# Patient Record
Sex: MALE | Race: WHITE | NOT HISPANIC OR LATINO | URBAN - METROPOLITAN AREA
[De-identification: names, ages, dates, MRNs, and addresses within clinical notes are randomized per-mention and may not be internally consistent; named-entity substitution may affect disease eponyms.]

---

## 2020-10-29 PROBLEM — Z00.00 ENCOUNTER FOR PREVENTIVE HEALTH EXAMINATION: Status: ACTIVE | Noted: 2020-10-29

## 2020-11-02 ENCOUNTER — OUTPATIENT (OUTPATIENT)
Dept: OUTPATIENT SERVICES | Facility: HOSPITAL | Age: 80
LOS: 1 days | End: 2020-11-02
Payer: MEDICARE

## 2020-11-02 ENCOUNTER — APPOINTMENT (OUTPATIENT)
Dept: PULMONOLOGY | Facility: CLINIC | Age: 80
End: 2020-11-02
Payer: MEDICARE

## 2020-11-02 VITALS
DIASTOLIC BLOOD PRESSURE: 80 MMHG | BODY MASS INDEX: 22.81 KG/M2 | HEIGHT: 69 IN | RESPIRATION RATE: 16 BRPM | OXYGEN SATURATION: 95 % | SYSTOLIC BLOOD PRESSURE: 157 MMHG | TEMPERATURE: 97.3 F | WEIGHT: 154 LBS | HEART RATE: 66 BPM

## 2020-11-02 DIAGNOSIS — K21.9 GASTRO-ESOPHAGEAL REFLUX DISEASE W/OUT ESOPHAGITIS: ICD-10-CM

## 2020-11-02 DIAGNOSIS — I10 ESSENTIAL (PRIMARY) HYPERTENSION: ICD-10-CM

## 2020-11-02 DIAGNOSIS — Z80.0 FAMILY HISTORY OF MALIGNANT NEOPLASM OF DIGESTIVE ORGANS: ICD-10-CM

## 2020-11-02 DIAGNOSIS — Z98.89 OTHER SPECIFIED POSTPROCEDURAL STATES: Chronic | ICD-10-CM

## 2020-11-02 DIAGNOSIS — K45.1 OTHER SPECIFIED ABDOMINAL HERNIA WITH GANGRENE: Chronic | ICD-10-CM

## 2020-11-02 DIAGNOSIS — E78.00 PURE HYPERCHOLESTEROLEMIA, UNSPECIFIED: ICD-10-CM

## 2020-11-02 DIAGNOSIS — N40.0 BENIGN PROSTATIC HYPERPLASIA WITHOUT LOWER URINARY TRACT SYMPMS: ICD-10-CM

## 2020-11-02 DIAGNOSIS — Z82.49 FAMILY HISTORY OF ISCHEMIC HEART DISEASE AND OTHER DISEASES OF THE CIRCULATORY SYSTEM: ICD-10-CM

## 2020-11-02 DIAGNOSIS — Z86.73 PERSONAL HISTORY OF TRANSIENT ISCHEMIC ATTACK (TIA), AND CEREBRAL INFARCTION W/OUT RESIDUAL DEFICITS: ICD-10-CM

## 2020-11-02 DIAGNOSIS — Z11.59 ENCOUNTER FOR SCREENING FOR OTHER VIRAL DISEASES: ICD-10-CM

## 2020-11-02 DIAGNOSIS — Z28.21 IMMUNIZATION NOT CARRIED OUT BECAUSE OF PATIENT REFUSAL: ICD-10-CM

## 2020-11-02 LAB
BASOPHILS # BLD AUTO: 0.11 K/UL
BASOPHILS NFR BLD AUTO: 0.9 %
EOSINOPHIL # BLD AUTO: 0.1 K/UL
EOSINOPHIL NFR BLD AUTO: 0.8 %
HCT VFR BLD CALC: 46.4 %
HGB BLD-MCNC: 14.8 G/DL
IMM GRANULOCYTES NFR BLD AUTO: 0.2 %
LYMPHOCYTES # BLD AUTO: 1.43 K/UL
LYMPHOCYTES NFR BLD AUTO: 11.4 %
MAN DIFF?: NORMAL
MCHC RBC-ENTMCNC: 30.6 PG
MCHC RBC-ENTMCNC: 31.9 GM/DL
MCV RBC AUTO: 95.9 FL
MONOCYTES # BLD AUTO: 1.37 K/UL
MONOCYTES NFR BLD AUTO: 10.9 %
NEUTROPHILS # BLD AUTO: 9.5 K/UL
NEUTROPHILS NFR BLD AUTO: 75.8 %
PLATELET # BLD AUTO: 371 K/UL
RBC # BLD: 4.84 M/UL
RBC # FLD: 13.2 %
SARS-COV-2 RNA SPEC QL NAA+PROBE: SIGNIFICANT CHANGE UP
WBC # FLD AUTO: 12.54 K/UL

## 2020-11-02 PROCEDURE — U0003: CPT

## 2020-11-02 PROCEDURE — 99204 OFFICE O/P NEW MOD 45 MIN: CPT

## 2020-11-02 RX ORDER — ASPIRIN 81 MG/1
81 TABLET, CHEWABLE ORAL
Refills: 0 | Status: ACTIVE | COMMUNITY

## 2020-11-02 RX ORDER — FINASTERIDE 5 MG/1
5 TABLET, FILM COATED ORAL
Qty: 90 | Refills: 0 | Status: ACTIVE | COMMUNITY
Start: 2020-06-05

## 2020-11-02 RX ORDER — TAMSULOSIN HYDROCHLORIDE 0.4 MG/1
0.4 CAPSULE ORAL
Qty: 90 | Refills: 0 | Status: ACTIVE | COMMUNITY
Start: 2020-08-17

## 2020-11-02 RX ORDER — DEXLANSOPRAZOLE 60 MG/1
60 CAPSULE, DELAYED RELEASE ORAL
Qty: 90 | Refills: 0 | Status: ACTIVE | COMMUNITY
Start: 2020-10-19

## 2020-11-02 RX ORDER — AMLODIPINE BESYLATE 10 MG/1
10 TABLET ORAL
Qty: 90 | Refills: 0 | Status: ACTIVE | COMMUNITY
Start: 2020-10-19

## 2020-11-02 RX ORDER — OMEGA-3-ACID ETHYL ESTERS CAPSULES 1 G/1
1 CAPSULE, LIQUID FILLED ORAL
Qty: 360 | Refills: 0 | Status: ACTIVE | COMMUNITY
Start: 2020-08-17

## 2020-11-02 NOTE — HISTORY OF PRESENT ILLNESS
[Current] : current [Never] : never [TextBox_4] : Mr. Huang is an 80-year-old male with a history of HTN, HLD, CVA with no residual deficits, GERD, BPH, and COPD who presents for evaluation. He lost his voice 3 weeks ago. He went for CT neck and chest, which showed a RUL apical cavitary mass with enlarged prevascular lymph node, concerning for metastatic disease. He brought CD, but it is cracked and unable to view.\par \par He denies any dyspnea at rest or with exertion. He reports a cough with sputum production related to chronic smoking (1/2-3/4 ppd for 65 years). Mucous is clear-yellow in color. No hemoptysis. No chest pain or wheezing. He has had change in voice with hoarseness for the past 3 weeks. He takes the Breo inhaler occasionally, but does not like to take daily. He is still smoking. Reports about 5 lb weight loss in the past year. Of note, he reports history of TB in his mother as a child, but he was never treated for TB or latent TB.\par \par CT Neck/Chest (Oct 2020): no appreciable neck mass or pathologically enlarged lymph nodes. In the chest, there is a left apical cavitary mass 7x6.4 cm, concerning for neoplasm or possibly cavitary pneumonia. Enlarged 2.5x1.4cm prevascular lymph node, concerning for metastasis. 6 mm LLL nodule, concerning for satellite nodule. Emphysema. Ectasia of ascending aorta 4 cm. Dilated main pulmonary artery. Coronary artery calcifications. Bilateral indeterminate adrenal nodules as well as renal structures (MRI abdomen recommended). Bilateral nonobstructing renal calculi, partially visualized, larger and more numerous on the left.

## 2020-11-02 NOTE — ASSESSMENT
[FreeTextEntry1] : Mr. Huang is an 80-year-old male with a history of HTN, HLD, CVA with no residual deficits, GERD, BPH, and COPD who presents for evaluation of a 7 x 6.4 cm left apical cavitary lung mass with associated prevascular lymphadenopathy and LLL 6 mm lung nodule. In the last month, he has developed loss of voice worrisome for pancoast tumor with recurrent laryngeal nerve injury. Unfortunately, CD of images is cracked and unable to be viewed. He reports a chronic cough with sputum production related to chronic smoking. Also with 5 lb weight loss in last year. No dyspnea at rest or with exertion. No chest pain, wheezing, hemoptysis, fevers, chills, or night sweats.\par \par 1. Left upper lobe apical cavitary lung mass with mediastinal lymphadenopathy:\par - Presentation worrisome for malignancy\par - Unable to view images as CD is cracked, son Yonas will bring me another copy of CD\par - Needs PET/CT this week to evaluate for metastases. There is a 6mm LLL lung nodule that may represent metastasis. There are also bilateral adrenal nodules. May require MRI abdomen\par - Depending on results of PET/CT, will need either CT-guided biopsy or bronch with EBUS/TBNA for staging\par - Reports history of TB in mother when he was a child. No personal history of TB. Never treated. Will check CBC with diff and quantiferon gold TB\par \par 2. COPD/Emphysema:\par - Complete PFTs\par - Start Breo Ellipta 200-25 mcg 1 inhalation daily, rinse after use\par - Albuterol inhaler prn\par - Check CBC with diff, alpha-1 antitrypsin, HIV, and 25-OH vitamin D\par \par 3. Current smoker:\par - Smoking counseling cessation provided\par - Declining further assistance at this time\par \par 4. Dilated pulmonary artery:\par - Obtain 2D-echo to evaluate presence and/or severity of pulmonary hypertension\par - Etiology likely related to COPD +/- hypertensive heart disease\par \par 5. HCM:\par - Declining influenza vaccination\par - Follow-up in 2-4 weeks or sooner prn

## 2020-11-02 NOTE — PHYSICAL EXAM
[No Acute Distress] : no acute distress [Well Nourished] : well nourished [Well Groomed] : well groomed [Well Developed] : well developed [Normal Oropharynx] : normal oropharynx [Normal Appearance] : normal appearance [Supple] : supple [No Neck Mass] : no neck mass [No JVD] : no jvd [Normal Rate/Rhythm] : normal rate/rhythm [Normal Pulses] : normal pulses [Normal S1, S2] : normal s1, s2 [No Murmurs] : no murmurs [No Resp Distress] : no resp distress [No Abnormalities] : no abnormalities [Benign] : benign [Not Tender] : not tender [Soft] : soft [Normal Gait] : normal gait [No Clubbing] : no clubbing [No Cyanosis] : no cyanosis [No Edema] : no edema [FROM] : FROM [Normal Color/ Pigmentation] : normal color/ pigmentation [No Focal Deficits] : no focal deficits [Cranial Nerves Intact] : cranial nerves intact [No Motor Deficits] : no motor deficits [Oriented x3] : oriented x3 [Normal Affect] : normal affect [TextBox_11] : hoarse voice [TextBox_68] : decreased breath sounds in left upper lung; distant breath sounds throughout

## 2020-11-02 NOTE — CONSULT LETTER
[DrKiersten  ___] : Dr. HARPER [Dear  ___] : Dear  [unfilled], [Consult Letter:] : I had the pleasure of evaluating your patient, [unfilled]. [Please see my note below.] : Please see my note below. [Consult Closing:] : Thank you very much for allowing me to participate in the care of this patient.  If you have any questions, please do not hesitate to contact me. [Sincerely,] : Sincerely, [FreeTextEntry2] : Dr. Felix Joiner MD\par Fax: [phone] [FreeTextEntry3] : Elijah Christina MD\par Attending Physician in Pulmonary & Critical Care Medicine\par  of Medicine\par [employer] [facility]

## 2020-11-04 LAB
25(OH)D3 SERPL-MCNC: 43 NG/ML
A1AT SERPL-MCNC: 159 MG/DL
HIV1+2 AB SPEC QL IA.RAPID: NONREACTIVE
M TB IFN-G BLD-IMP: NEGATIVE
QUANTIFERON TB PLUS MITOGEN MINUS NIL: 0.69 IU/ML
QUANTIFERON TB PLUS NIL: 0.02 IU/ML
QUANTIFERON TB PLUS TB1 MINUS NIL: 0.12 IU/ML
QUANTIFERON TB PLUS TB2 MINUS NIL: 0.08 IU/ML

## 2020-11-09 ENCOUNTER — APPOINTMENT (OUTPATIENT)
Dept: NUCLEAR MEDICINE | Facility: IMAGING CENTER | Age: 80
End: 2020-11-09
Payer: MEDICARE

## 2020-11-09 ENCOUNTER — OUTPATIENT (OUTPATIENT)
Dept: OUTPATIENT SERVICES | Facility: HOSPITAL | Age: 80
LOS: 1 days | End: 2020-11-09
Payer: MEDICARE

## 2020-11-09 DIAGNOSIS — Z98.89 OTHER SPECIFIED POSTPROCEDURAL STATES: Chronic | ICD-10-CM

## 2020-11-09 DIAGNOSIS — J98.4 OTHER DISORDERS OF LUNG: ICD-10-CM

## 2020-11-09 DIAGNOSIS — K45.1 OTHER SPECIFIED ABDOMINAL HERNIA WITH GANGRENE: Chronic | ICD-10-CM

## 2020-11-09 PROCEDURE — 78815 PET IMAGE W/CT SKULL-THIGH: CPT | Mod: 26,PI

## 2020-11-09 PROCEDURE — 78815 PET IMAGE W/CT SKULL-THIGH: CPT

## 2020-11-09 PROCEDURE — A9552: CPT

## 2020-11-12 ENCOUNTER — APPOINTMENT (OUTPATIENT)
Dept: CV DIAGNOSITCS | Facility: HOSPITAL | Age: 80
End: 2020-11-12

## 2020-11-12 ENCOUNTER — OUTPATIENT (OUTPATIENT)
Dept: OUTPATIENT SERVICES | Facility: HOSPITAL | Age: 80
LOS: 1 days | End: 2020-11-12
Payer: MEDICARE

## 2020-11-12 ENCOUNTER — TRANSCRIPTION ENCOUNTER (OUTPATIENT)
Age: 80
End: 2020-11-12

## 2020-11-12 DIAGNOSIS — Z98.89 OTHER SPECIFIED POSTPROCEDURAL STATES: Chronic | ICD-10-CM

## 2020-11-12 DIAGNOSIS — K45.1 OTHER SPECIFIED ABDOMINAL HERNIA WITH GANGRENE: Chronic | ICD-10-CM

## 2020-11-12 PROCEDURE — 93306 TTE W/DOPPLER COMPLETE: CPT | Mod: 26

## 2020-11-15 ENCOUNTER — NON-APPOINTMENT (OUTPATIENT)
Age: 80
End: 2020-11-15

## 2020-11-19 DIAGNOSIS — Z01.818 ENCOUNTER FOR OTHER PREPROCEDURAL EXAMINATION: ICD-10-CM

## 2020-11-20 ENCOUNTER — APPOINTMENT (OUTPATIENT)
Dept: DISASTER EMERGENCY | Facility: CLINIC | Age: 80
End: 2020-11-20

## 2020-11-23 ENCOUNTER — OUTPATIENT (OUTPATIENT)
Dept: OUTPATIENT SERVICES | Facility: HOSPITAL | Age: 80
LOS: 1 days | End: 2020-11-23
Payer: MEDICARE

## 2020-11-23 VITALS
OXYGEN SATURATION: 97 % | HEIGHT: 69 IN | DIASTOLIC BLOOD PRESSURE: 64 MMHG | RESPIRATION RATE: 18 BRPM | HEART RATE: 64 BPM | WEIGHT: 162.04 LBS | SYSTOLIC BLOOD PRESSURE: 132 MMHG | TEMPERATURE: 97 F

## 2020-11-23 DIAGNOSIS — I10 ESSENTIAL (PRIMARY) HYPERTENSION: ICD-10-CM

## 2020-11-23 DIAGNOSIS — R59.0 LOCALIZED ENLARGED LYMPH NODES: ICD-10-CM

## 2020-11-23 DIAGNOSIS — K45.1 OTHER SPECIFIED ABDOMINAL HERNIA WITH GANGRENE: Chronic | ICD-10-CM

## 2020-11-23 DIAGNOSIS — Z98.89 OTHER SPECIFIED POSTPROCEDURAL STATES: Chronic | ICD-10-CM

## 2020-11-23 DIAGNOSIS — Z90.49 ACQUIRED ABSENCE OF OTHER SPECIFIED PARTS OF DIGESTIVE TRACT: Chronic | ICD-10-CM

## 2020-11-23 LAB
ALBUMIN SERPL ELPH-MCNC: 4.2 G/DL — SIGNIFICANT CHANGE UP (ref 3.3–5)
ALP SERPL-CCNC: 81 U/L — SIGNIFICANT CHANGE UP (ref 40–120)
ALT FLD-CCNC: 10 U/L — SIGNIFICANT CHANGE UP (ref 4–41)
ANION GAP SERPL CALC-SCNC: 12 MMO/L — SIGNIFICANT CHANGE UP (ref 7–14)
AST SERPL-CCNC: 10 U/L — SIGNIFICANT CHANGE UP (ref 4–40)
BILIRUB SERPL-MCNC: 0.3 MG/DL — SIGNIFICANT CHANGE UP (ref 0.2–1.2)
BUN SERPL-MCNC: 24 MG/DL — HIGH (ref 7–23)
CALCIUM SERPL-MCNC: 9.9 MG/DL — SIGNIFICANT CHANGE UP (ref 8.4–10.5)
CHLORIDE SERPL-SCNC: 102 MMOL/L — SIGNIFICANT CHANGE UP (ref 98–107)
CO2 SERPL-SCNC: 27 MMOL/L — SIGNIFICANT CHANGE UP (ref 22–31)
CREAT SERPL-MCNC: 1.32 MG/DL — HIGH (ref 0.5–1.3)
GLUCOSE SERPL-MCNC: 73 MG/DL — SIGNIFICANT CHANGE UP (ref 70–99)
HCT VFR BLD CALC: 43.9 % — SIGNIFICANT CHANGE UP (ref 39–50)
HGB BLD-MCNC: 14.4 G/DL — SIGNIFICANT CHANGE UP (ref 13–17)
MCHC RBC-ENTMCNC: 31.1 PG — SIGNIFICANT CHANGE UP (ref 27–34)
MCHC RBC-ENTMCNC: 32.8 % — SIGNIFICANT CHANGE UP (ref 32–36)
MCV RBC AUTO: 94.8 FL — SIGNIFICANT CHANGE UP (ref 80–100)
NRBC # FLD: 0 K/UL — SIGNIFICANT CHANGE UP (ref 0–0)
PLATELET # BLD AUTO: 364 K/UL — SIGNIFICANT CHANGE UP (ref 150–400)
PMV BLD: 10.9 FL — SIGNIFICANT CHANGE UP (ref 7–13)
POTASSIUM SERPL-MCNC: 3.8 MMOL/L — SIGNIFICANT CHANGE UP (ref 3.5–5.3)
POTASSIUM SERPL-SCNC: 3.8 MMOL/L — SIGNIFICANT CHANGE UP (ref 3.5–5.3)
PROT SERPL-MCNC: 7.2 G/DL — SIGNIFICANT CHANGE UP (ref 6–8.3)
RBC # BLD: 4.63 M/UL — SIGNIFICANT CHANGE UP (ref 4.2–5.8)
RBC # FLD: 13.2 % — SIGNIFICANT CHANGE UP (ref 10.3–14.5)
SODIUM SERPL-SCNC: 141 MMOL/L — SIGNIFICANT CHANGE UP (ref 135–145)
WBC # BLD: 13.26 K/UL — HIGH (ref 3.8–10.5)
WBC # FLD AUTO: 13.26 K/UL — HIGH (ref 3.8–10.5)

## 2020-11-23 PROCEDURE — 93010 ELECTROCARDIOGRAM REPORT: CPT

## 2020-11-23 RX ORDER — ASPIRIN/CALCIUM CARB/MAGNESIUM 324 MG
1 TABLET ORAL
Qty: 0 | Refills: 0 | DISCHARGE

## 2020-11-23 RX ORDER — AMLODIPINE BESYLATE 2.5 MG/1
1 TABLET ORAL
Qty: 0 | Refills: 0 | DISCHARGE

## 2020-11-23 RX ORDER — DEXLANSOPRAZOLE 30 MG/1
1 CAPSULE, DELAYED RELEASE ORAL
Qty: 0 | Refills: 0 | DISCHARGE

## 2020-11-23 NOTE — H&P PST ADULT - NEGATIVE GENERAL GENITOURINARY SYMPTOMS
no bladder infections/no dysuria/no flank pain R/no hematuria/no flank pain L/no incontinence/no renal colic

## 2020-11-23 NOTE — H&P PST ADULT - MS EXT PE MLT D E PC
Chief Complaint   Patient presents with     Blood Draw     Labs collected from PIV placed by Heike GUTIERREZ. Line flushed wtih saline. Vs taken and pt checked in for appt.     Labs drawn from PIV placed by Heike vascular access RN. Line flushed with saline. Vitals taken. Pt checked in for appointment(s).    Mariela Santamaria RN   no clubbing/no cyanosis/no pedal edema

## 2020-11-23 NOTE — H&P PST ADULT - NSICDXPASTMEDICALHX_GEN_ALL_CORE_FT
PAST MEDICAL HISTORY:  Acid reflux     H/O cataract extraction left    HTN (hypertension)     Localized enlarged lymph nodes      PAST MEDICAL HISTORY:  Acid reflux     H/O cataract extraction left    History of TIA (transient ischemic attack) ~2017    HTN (hypertension)     Localized enlarged lymph nodes

## 2020-11-23 NOTE — H&P PST ADULT - NSANTHOSAYNRD_GEN_A_CORE
No. FROYLAN screening performed.  STOP BANG Legend: 0-2 = LOW Risk; 3-4 = INTERMEDIATE Risk; 5-8 = HIGH Risk

## 2020-11-23 NOTE — H&P PST ADULT - NSICDXPROBLEM_GEN_ALL_CORE_FT
PROBLEM DIAGNOSES  Problem: Localized enlarged lymph nodes  Assessment and Plan: pt scheduled for claudy endobronchial ultrasound bronch/radia, endobronchial ultrasound/linear on 12/02/2020  Preop instructions provided. Pt verbalized understanding.   pt to take dexilant for GI prophylaxis  pt aware of need for COVID testing prior to procedure and advised to coordinate with surgeon  pt has appt for med eval today, pending reports from PST, copy requested      Problem: HTN (hypertension)  Assessment and Plan: pt instructed to take norvasc with a sip of water on the morning of the surgery

## 2020-11-23 NOTE — H&P PST ADULT - NSICDXPASTSURGICALHX_GEN_ALL_CORE_FT
PAST SURGICAL HISTORY:  Hernia of appendix with gangrene     History of bilateral inguinal hernia repair     S/P cholecystectomy

## 2020-11-23 NOTE — H&P PST ADULT - NEGATIVE ENMT SYMPTOMS
no sinus symptoms/no hearing difficulty/hoarseness/no nasal congestion/no post-nasal discharge/no throat pain/no dysphagia

## 2020-11-25 ENCOUNTER — APPOINTMENT (OUTPATIENT)
Dept: PULMONOLOGY | Facility: CLINIC | Age: 80
End: 2020-11-25
Payer: MEDICARE

## 2020-11-25 VITALS
OXYGEN SATURATION: 96 % | DIASTOLIC BLOOD PRESSURE: 74 MMHG | BODY MASS INDEX: 23.34 KG/M2 | TEMPERATURE: 98.2 F | WEIGHT: 154 LBS | SYSTOLIC BLOOD PRESSURE: 122 MMHG | HEIGHT: 68 IN | HEART RATE: 71 BPM

## 2020-11-25 DIAGNOSIS — J98.4 OTHER DISORDERS OF LUNG: ICD-10-CM

## 2020-11-25 DIAGNOSIS — J38.00 PARALYSIS OF VOCAL CORDS AND LARYNX, UNSPECIFIED: ICD-10-CM

## 2020-11-25 DIAGNOSIS — J44.9 CHRONIC OBSTRUCTIVE PULMONARY DISEASE, UNSPECIFIED: ICD-10-CM

## 2020-11-25 DIAGNOSIS — F17.200 NICOTINE DEPENDENCE, UNSPECIFIED, UNCOMPLICATED: ICD-10-CM

## 2020-11-25 PROCEDURE — ZZZZZ: CPT

## 2020-11-25 PROCEDURE — 94010 BREATHING CAPACITY TEST: CPT

## 2020-11-25 PROCEDURE — 94729 DIFFUSING CAPACITY: CPT

## 2020-11-25 PROCEDURE — 94726 PLETHYSMOGRAPHY LUNG VOLUMES: CPT

## 2020-11-25 PROCEDURE — 99214 OFFICE O/P EST MOD 30 MIN: CPT | Mod: 25

## 2020-11-25 RX ORDER — FLUTICASONE FUROATE AND VILANTEROL TRIFENATATE 200; 25 UG/1; UG/1
200-25 POWDER RESPIRATORY (INHALATION)
Qty: 1 | Refills: 5 | Status: ACTIVE | COMMUNITY
Start: 2020-10-07 | End: 1900-01-01

## 2020-11-25 RX ORDER — NICOTINE 4 MG/1
10 INHALANT RESPIRATORY (INHALATION)
Qty: 1 | Refills: 1 | Status: ACTIVE | COMMUNITY
Start: 2020-11-25 | End: 1900-01-01

## 2020-11-25 NOTE — REASON FOR VISIT
[Follow-Up] : a follow-up visit [Abnormal CXR/ Chest CT] : an abnormal CXR/ chest CT [Lung Cancer] : lung cancer

## 2020-11-29 ENCOUNTER — APPOINTMENT (OUTPATIENT)
Dept: DISASTER EMERGENCY | Facility: CLINIC | Age: 80
End: 2020-11-29

## 2020-11-29 DIAGNOSIS — Z01.818 ENCOUNTER FOR OTHER PREPROCEDURAL EXAMINATION: ICD-10-CM

## 2020-11-30 PROBLEM — Z86.73 PERSONAL HISTORY OF TRANSIENT ISCHEMIC ATTACK (TIA), AND CEREBRAL INFARCTION WITHOUT RESIDUAL DEFICITS: Chronic | Status: ACTIVE | Noted: 2020-11-23

## 2020-11-30 PROBLEM — R59.0 LOCALIZED ENLARGED LYMPH NODES: Chronic | Status: ACTIVE | Noted: 2020-11-23

## 2020-11-30 PROBLEM — Z98.49 CATARACT EXTRACTION STATUS, UNSPECIFIED EYE: Chronic | Status: ACTIVE | Noted: 2020-11-23

## 2020-11-30 PROBLEM — K21.9 GASTRO-ESOPHAGEAL REFLUX DISEASE WITHOUT ESOPHAGITIS: Chronic | Status: ACTIVE | Noted: 2020-11-23

## 2020-11-30 NOTE — HISTORY OF PRESENT ILLNESS
[Current] : current [Never] : never [TextBox_4] : Mr. Huang is an 80-year-old male with a history of HTN, HLD, CVA with no residual deficits, GERD, BPH, and COPD who presents for follow-up. He initially started to experience loss of voice in Oct 2020. He went for CT neck and chest, which showed a RUL apical cavitary mass with enlarged prevascular lymph node, concerning for metastatic disease. \par \par He went for PET/CT in Nov 2020, which revealed hypermetabolic cavitary CORBY mass consistent with primary lung cancer with subcentimeter FDG-avid LLL lung nodule suspicious for metastasis. There is hypermetabolic ipsilateral mediastinal lymphadenopathy and small FDG-avid bilateral hilar lymph nodes that are nonspecific. 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 He is pending EBUS/TBNA +/- navigational bronchoscopy for diagnosis and staging next week (Dec 2). He will then need oncology and rad onc evaluations for chemo/RT, possible thoracic surgery depending on stage.\par \par He denies any dyspnea at rest or with exertion. He reports a cough with sputum production related to chronic smoking (1/2-3/4 ppd for 65 years). He is down to 5-6 cigs/day. Mucous is clear-yellow in color. No hemoptysis. No chest pain or wheezing. He has had change in voice with hoarseness that is stable. He takes the Breo inhaler occasionally, but does not like to take daily. He is still smoking. Reports about 5 lb weight loss in the past year. Of note, he reports history of TB in his mother as a child, but he was never treated for TB or latent TB.\par \par CT Neck/Chest (Oct 2020): no appreciable neck mass or pathologically enlarged lymph nodes. In the chest, there is a left apical cavitary mass 7x6.4 cm, concerning for neoplasm or possibly cavitary pneumonia. Enlarged 2.5x1.4cm prevascular lymph node, concerning for metastasis. 6 mm LLL nodule, concerning for satellite nodule. Emphysema. Ectasia of ascending aorta 4 cm. Dilated main pulmonary artery. Coronary artery calcifications. Bilateral indeterminate adrenal nodules as well as renal structures (MRI abdomen recommended). Bilateral nonobstructing renal calculi, partially visualized, larger and more numerous on the left.

## 2020-11-30 NOTE — ASSESSMENT
[FreeTextEntry1] : Mr. Huang is an 80-year-old male with a history of HTN, HLD, CVA with no residual deficits, GERD, BPH, and COPD who presents for evaluation of a large left apical cavitary lung mass with associated prevascular lymphadenopathy and LLL 6 mm lung nodule. He also has a chronic cough with sputum production related to chronic smoking. Also with 5 lb weight loss in last year. No dyspnea at rest or with exertion. No chest pain, wheezing, hemoptysis, fevers, chills, or night sweats.\par \par PET/CT (Nov 2020) with hypermetabolic cavitary CORBY mass consistent with primary lung cancer with subcentimeter FDG-avid LLL lung nodule suspicious for metastasis. There is hypermetabolic ipsilateral mediastinal lymphadenopathy and small FDG-avid bilateral hilar lymph nodes that are nonspecific. 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 \par \par 1. Left upper lobe apical cavitary lung mass with mediastinal lymphadenopathy:\par - Possible ipsilateral pulmonary metastasis and contralateral lymph node involvement\par - Pending EBUS/TBNA +/- navigational bronchoscopy next week for diagnosis and staging\par - Will reach out to oncology and radiation oncology as this is likely minimum stage III disease\par - No evidence of distant mets\par - Reports history of TB in mother when he was a child. No personal history of TB. Never treated. CBC with mild neutrophilic leukocytosis due to smoking. Quant gold is negative\par \par 2. COPD/Emphysema:\par - PFTs (Nov 2020) with moderate obstruction, increased TLC and RV consistent with hyperinflation and air trapping, and moderately reduced diffusing capacity\par - Continue Breo Ellipta 200-25 mcg 1 inhalation daily, rinse after use\par - Albuterol inhaler prn\par - No peripheral eosinophilia on CBC, alpha-1 antitrypsin is normal, 25-OH vitamin D normal, and HIV negative\par \par 3. Current smoker:\par - Smoking counseling cessation provided\par - Start Nicotine patch daily and Nicotrol inhalers prn\par - He was counseled to avoid smoking for at least 5 days prior to bronchoscopy\par \par 4. Dilated pulmonary artery:\par - Obtain 2D-echo to evaluate presence and/or severity of pulmonary hypertension\par - Etiology likely related to COPD +/- hypertensive heart disease\par \par 5. HCM:\par - Declining influenza vaccination\par - Follow-up after bronchoscopy

## 2020-11-30 NOTE — PHYSICAL EXAM
[No Acute Distress] : no acute distress [Well Nourished] : well nourished [Well Groomed] : well groomed [Well Developed] : well developed [Normal Oropharynx] : normal oropharynx [Normal Appearance] : normal appearance [Supple] : supple [No Neck Mass] : no neck mass [No JVD] : no jvd [Normal Rate/Rhythm] : normal rate/rhythm [Normal Pulses] : normal pulses [Normal S1, S2] : normal s1, s2 [No Murmurs] : no murmurs [No Resp Distress] : no resp distress [No Abnormalities] : no abnormalities [Benign] : benign [Not Tender] : not tender [Soft] : soft [Normal Gait] : normal gait [No Clubbing] : no clubbing [No Cyanosis] : no cyanosis [No Edema] : no edema [FROM] : FROM [Normal Color/ Pigmentation] : normal color/ pigmentation [No Focal Deficits] : no focal deficits [Cranial Nerves Intact] : cranial nerves intact [No Motor Deficits] : no motor deficits [Oriented x3] : oriented x3 [Normal Affect] : normal affect [TextBox_11] : hoarse voice [TextBox_68] : decreased breath sounds in left upper lung; distant breath sounds throughout; prolonged expiratory flow

## 2020-12-04 ENCOUNTER — APPOINTMENT (OUTPATIENT)
Dept: RADIATION ONCOLOGY | Facility: CLINIC | Age: 80
End: 2020-12-04
Payer: MEDICARE

## 2020-12-04 ENCOUNTER — APPOINTMENT (OUTPATIENT)
Dept: DISASTER EMERGENCY | Facility: CLINIC | Age: 80
End: 2020-12-04

## 2020-12-04 VITALS
HEART RATE: 62 BPM | RESPIRATION RATE: 16 BRPM | DIASTOLIC BLOOD PRESSURE: 68 MMHG | WEIGHT: 155.97 LBS | HEIGHT: 69 IN | OXYGEN SATURATION: 95 % | BODY MASS INDEX: 23.1 KG/M2 | SYSTOLIC BLOOD PRESSURE: 154 MMHG | TEMPERATURE: 97.1 F

## 2020-12-04 DIAGNOSIS — R91.8 OTHER NONSPECIFIC ABNORMAL FINDING OF LUNG FIELD: ICD-10-CM

## 2020-12-04 PROCEDURE — 99205 OFFICE O/P NEW HI 60 MIN: CPT | Mod: GC

## 2020-12-04 RX ORDER — ATORVASTATIN CALCIUM 40 MG/1
40 TABLET, FILM COATED ORAL
Qty: 90 | Refills: 0 | Status: DISCONTINUED | COMMUNITY
Start: 2020-08-17 | End: 2020-12-04

## 2020-12-04 RX ORDER — NICOTINE 21 MG/24HR
14 PATCH, TRANSDERMAL 24 HOURS TRANSDERMAL DAILY
Qty: 1 | Refills: 5 | Status: DISCONTINUED | COMMUNITY
Start: 2020-11-25 | End: 2020-12-04

## 2020-12-04 RX ORDER — ALBUTEROL SULFATE 90 UG/1
108 (90 BASE) AEROSOL, METERED RESPIRATORY (INHALATION)
Qty: 18 | Refills: 0 | Status: DISCONTINUED | COMMUNITY
Start: 2020-10-07 | End: 2020-12-04

## 2020-12-04 NOTE — PHYSICAL EXAM
[Sclera] : the sclera and conjunctiva were normal [Outer Ear] : the ears and nose were normal in appearance [] : no respiratory distress [Edema] : no peripheral edema present [Nondistended] : nondistended [Normal] : oriented to person, place and time, the affect was normal, the mood was normal and not anxious

## 2020-12-05 LAB — SARS-COV-2 N GENE NPH QL NAA+PROBE: NOT DETECTED

## 2020-12-14 NOTE — HISTORY OF PRESENT ILLNESS
[FreeTextEntry1] : Mr. Huang is an 80-year-old current smoker male with multiple medical comorbidities including COPD now with a CORBY apical mass, mediastinal adenopathy and a LLL nodule all suspicious for malignancy. \par \par He initially started to experience loss of voice in Oct 2020. CT neck and chest showed a left apical cavitary mass 7x6.4 cm, enlarged 2.5x1.4cm prevascular lymph node, and a 6 mm LLL nodule concerning for satellite nodule. \par \par He went for PET/CT in Nov 2020, which revealed a 7 cm hypermetabolic cavitary CORBY mass extending from the left apex to the left suprahilar region in addition to a 0.5 cm FDG-avid LLL lung nodule suspicious for metastasis. Hypermetabolic ipsilateral mediastinal lymphadenopathy (L paratracheal, AP window, prevascular) and small FDG-avid bilateral hilar lymph nodes that are nonspecific. A small FDG-avid focus in the R lung along the fissure was noted. 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 Left adrenal adenoma also noted. \par \par He presents for consultation. He complains of voice hoarseness and difficulty with swallowing for the past ~2 months. He has occasional cough related to his COPD. He has no difficulty breathing or weight loss. He has no other complaints.

## 2020-12-14 NOTE — REVIEW OF SYSTEMS
[Cough] : cough [Easy Bruising] : a tendency for easy bruising [Negative] : Allergic/Immunologic [Dysphagia] : dysphagia [Hoarseness] : hoarseness [Dyspepsia: Grade 0] : Dyspepsia: Grade 0 [Dysphagia: Grade 1 - Symptomatic, able to eat regular diet] : Dysphagia: Grade 1 - Symptomatic, able to eat regular diet [Nausea: Grade 0] : Nausea: Grade 0 [Vomiting: Grade 0] : Vomiting: Grade 0 [Fatigue: Grade 0] : Fatigue: Grade 0 [Cough: Grade 1 - Mild symptoms; nonprescription intervention indicated] : Cough: Grade 1 - Mild symptoms; nonprescription intervention indicated [Dyspnea: Grade 0] : Dyspnea: Grade 0 [Hoarseness: Grade 1 - Mild or intermittent voice change; fully understandable; self-resolves] : Hoarseness: Grade 1 - Mild or intermittent voice change; fully understandable; self-resolves [Hypoxia: Grade 0] : Hypoxia: Grade 0 [Pneumonitis: Grade 0] : Pneumonitis: Grade 0 [Voice Alteration: Grade 1 - Mild or intermittent change from normal voice] : Voice Alteration: Grade 1 - Mild or intermittent change from normal voice [Odynophagia] : no odynophagia [Shortness Of Breath] : no shortness of breath [Easy Bleeding] : no tendency for easy bleeding [Swollen Glands] : no swollen glands [FreeTextEntry7] : Dysphagia

## 2020-12-15 PROBLEM — R91.8 LUNG NODULES: Status: ACTIVE | Noted: 2020-12-15

## 2020-12-19 ENCOUNTER — APPOINTMENT (OUTPATIENT)
Dept: DISASTER EMERGENCY | Facility: CLINIC | Age: 80
End: 2020-12-19

## 2020-12-23 ENCOUNTER — APPOINTMENT (OUTPATIENT)
Dept: PULMONOLOGY | Facility: HOSPITAL | Age: 80
End: 2020-12-23

## 2021-01-14 ENCOUNTER — NON-APPOINTMENT (OUTPATIENT)
Age: 81
End: 2021-01-14

## 2024-04-22 NOTE — H&P PST ADULT - HISTORY OF PRESENT ILLNESS
80 y.o. male with hx of HTN, c/o hoarseness x 1 month, denies cough, dyspnea, dysphagia, weight loss or fatigue, presents to PST with preop diagnosis of localized enlarged lymph nodes, scheduled for ASHISH endobronchial ultrasound bronch/radia, endobronchial ultrasound/linear
No
